# Patient Record
Sex: MALE | Race: WHITE | NOT HISPANIC OR LATINO | Employment: OTHER | URBAN - METROPOLITAN AREA
[De-identification: names, ages, dates, MRNs, and addresses within clinical notes are randomized per-mention and may not be internally consistent; named-entity substitution may affect disease eponyms.]

---

## 2017-01-23 ENCOUNTER — LAB VISIT (OUTPATIENT)
Dept: LAB | Facility: HOSPITAL | Age: 67
End: 2017-01-23
Attending: INTERNAL MEDICINE
Payer: MEDICARE

## 2017-01-23 DIAGNOSIS — R53.83 OTHER MALAISE AND FATIGUE: ICD-10-CM

## 2017-01-23 DIAGNOSIS — G44.009: ICD-10-CM

## 2017-01-23 DIAGNOSIS — E03.8 OTHER SPECIFIED ACQUIRED HYPOTHYROIDISM: ICD-10-CM

## 2017-01-23 DIAGNOSIS — M25.50 PAIN IN JOINT, SITE UNSPECIFIED: Primary | ICD-10-CM

## 2017-01-23 DIAGNOSIS — R53.81 OTHER MALAISE AND FATIGUE: ICD-10-CM

## 2017-01-23 LAB
CCP AB SER IA-ACNC: 1.7 U/ML
COMPLEXED PSA SERPL-MCNC: 0.66 NG/ML
CORTIS SERPL-MCNC: 7 UG/DL
ERYTHROCYTE [SEDIMENTATION RATE] IN BLOOD BY WESTERGREN METHOD: 14 MM/HR
T3FREE SERPL-MCNC: 2.8 PG/ML
T4 FREE SERPL-MCNC: 0.92 NG/DL
THYROGLOB AB SERPL IA-ACNC: 159.4 IU/ML
THYROPEROXIDASE IGG SERPL-ACNC: 15.4 IU/ML
TSH SERPL DL<=0.005 MIU/L-ACNC: 4.32 UIU/ML
URATE SERPL-MCNC: 6.6 MG/DL

## 2017-01-23 PROCEDURE — 82024 ASSAY OF ACTH: CPT

## 2017-01-23 PROCEDURE — 82533 TOTAL CORTISOL: CPT

## 2017-01-23 PROCEDURE — 86200 CCP ANTIBODY: CPT

## 2017-01-23 PROCEDURE — 86225 DNA ANTIBODY NATIVE: CPT

## 2017-01-23 PROCEDURE — 84443 ASSAY THYROID STIM HORMONE: CPT

## 2017-01-23 PROCEDURE — 86235 NUCLEAR ANTIGEN ANTIBODY: CPT | Mod: 59

## 2017-01-23 PROCEDURE — 86038 ANTINUCLEAR ANTIBODIES: CPT

## 2017-01-23 PROCEDURE — 86800 THYROGLOBULIN ANTIBODY: CPT

## 2017-01-23 PROCEDURE — 87798 DETECT AGENT NOS DNA AMP: CPT

## 2017-01-23 PROCEDURE — 84153 ASSAY OF PSA TOTAL: CPT

## 2017-01-23 PROCEDURE — 86617 LYME DISEASE ANTIBODY: CPT | Mod: 91

## 2017-01-23 PROCEDURE — 84481 FREE ASSAY (FT-3): CPT

## 2017-01-23 PROCEDURE — 85651 RBC SED RATE NONAUTOMATED: CPT | Mod: PO

## 2017-01-23 PROCEDURE — 30000890 MAYO MISCELLANEOUS TEST (REFLEX)

## 2017-01-23 PROCEDURE — 84439 ASSAY OF FREE THYROXINE: CPT

## 2017-01-23 PROCEDURE — 86235 NUCLEAR ANTIGEN ANTIBODY: CPT

## 2017-01-23 PROCEDURE — 86039 ANTINUCLEAR ANTIBODIES (ANA): CPT

## 2017-01-23 PROCEDURE — 86376 MICROSOMAL ANTIBODY EACH: CPT

## 2017-01-23 PROCEDURE — 84550 ASSAY OF BLOOD/URIC ACID: CPT

## 2017-01-24 LAB
ACTH PLAS-MCNC: 44 PG/ML
ANA SER QL IF: POSITIVE
ANA TITR SER IF: NORMAL {TITER}
ANTI-SSA ANTIBODY: 0.4 EU
ANTI-SSA INTERPRETATION: NEGATIVE
ANTI-SSB ANTIBODY: 27.38 EU
ANTI-SSB INTERPRETATION: POSITIVE
B BURGDOR AB PATRN SER IB-IMP: NORMAL
B BURGDOR IGG PATRN SER IB-IMP: NORMAL KDA
B BURGDOR IGG SER QL IB: NEGATIVE
B BURGDOR IGM PATRN SER IB-IMP: NORMAL KDA
B BURGDOR IGM SER QL IB: NEGATIVE
DSDNA AB SER-ACNC: NORMAL [IU]/ML

## 2017-01-25 LAB
BARTONELLA DNA BLD QL NAA+PROBE: NEGATIVE
SPECIMEN SOURCE: NORMAL

## 2017-01-26 LAB
ANTI SM ANTIBODY: 0.68 EU
ANTI SM/RNP ANTIBODY: 1.08 EU
ANTI-SM INTERPRETATION: NEGATIVE
ANTI-SM/RNP INTERPRETATION: NEGATIVE
ANTI-SSA ANTIBODY: 0.4 EU
ANTI-SSA INTERPRETATION: NEGATIVE
ANTI-SSB ANTIBODY: 27.38 EU
ANTI-SSB INTERPRETATION: POSITIVE
DSDNA AB SER-ACNC: ABNORMAL [IU]/ML
MAYO MISCELLANEOUS RESULT (REF): NORMAL

## 2017-01-27 LAB — MAYO MISCELLANEOUS RESULT (REF): NORMAL

## 2017-05-08 RX ORDER — SILDENAFIL CITRATE 100 MG/1
TABLET, FILM COATED ORAL
Qty: 8 TABLET | Refills: 11 | Status: SHIPPED | OUTPATIENT
Start: 2017-05-08

## 2017-12-15 DIAGNOSIS — Z11.59 NEED FOR HEPATITIS C SCREENING TEST: ICD-10-CM

## 2017-12-18 ENCOUNTER — PATIENT OUTREACH (OUTPATIENT)
Dept: ADMINISTRATIVE | Facility: HOSPITAL | Age: 67
End: 2017-12-18

## 2017-12-18 NOTE — PROGRESS NOTES
Health Maintenance Due   Topic Date Due    Hepatitis C Screening  1950    TETANUS VACCINE  04/12/1967    Zoster Vaccine  04/12/2009    Pneumococcal (65+) (2 of 2 - PPSV23) 04/25/2017    Influenza Vaccine  08/01/2017     htn report.

## 2017-12-21 ENCOUNTER — LAB VISIT (OUTPATIENT)
Dept: LAB | Facility: HOSPITAL | Age: 67
End: 2017-12-21
Attending: INTERNAL MEDICINE
Payer: MEDICARE

## 2017-12-21 DIAGNOSIS — R14.0 ABDOMINAL DISTENTION: Primary | ICD-10-CM

## 2017-12-21 LAB
GLUCOSE LTT, 15 MIN: 113 MG/DL
GLUCOSE SERPL-MCNC: 90 MG/DL
GLUCOSE, LTT 90 MIN: 65 MG/DL
LACTOSE 1H P 50 G LAC PO SERPL-MCNC: 73 MG/DL
LACTOSE 2H P 50 G LAC PO SERPL-MCNC: 72 MG/DL
LACTOSE 30M P 50 G LAC PO SERPL-MCNC: 80 MG/DL
T4 FREE SERPL-MCNC: 1.03 NG/DL
TSH SERPL DL<=0.005 MIU/L-ACNC: 5.74 UIU/ML

## 2017-12-21 PROCEDURE — 84443 ASSAY THYROID STIM HORMONE: CPT

## 2017-12-21 PROCEDURE — 84439 ASSAY OF FREE THYROXINE: CPT

## 2017-12-21 PROCEDURE — 83516 IMMUNOASSAY NONANTIBODY: CPT | Mod: 59

## 2017-12-21 PROCEDURE — 82951 GLUCOSE TOLERANCE TEST (GTT): CPT

## 2017-12-26 LAB
GLIADIN PEPTIDE IGA SER-ACNC: 8 UNITS
GLIADIN PEPTIDE IGG SER-ACNC: 3 UNITS
IGA SERPL-MCNC: 131 MG/DL
TTG IGA SER IA-ACNC: 5 UNITS
TTG IGG SER IA-ACNC: 5 UNITS

## 2017-12-27 ENCOUNTER — HOSPITAL ENCOUNTER (OUTPATIENT)
Dept: RADIOLOGY | Facility: HOSPITAL | Age: 67
Discharge: HOME | End: 2017-12-27
Attending: INTERNAL MEDICINE
Payer: MEDICARE

## 2017-12-27 DIAGNOSIS — R11.0 NAUSEA: ICD-10-CM

## 2017-12-27 DIAGNOSIS — R10.9 PAIN IN THE ABDOMEN: ICD-10-CM

## 2017-12-27 PROCEDURE — 76700 US EXAM ABDOM COMPLETE: CPT | Mod: 26,,, | Performed by: RADIOLOGY

## 2017-12-27 PROCEDURE — 76700 US EXAM ABDOM COMPLETE: CPT | Mod: TC,PO

## 2018-01-10 ENCOUNTER — INITIAL CONSULT (OUTPATIENT)
Dept: RHEUMATOLOGY | Facility: CLINIC | Age: 68
End: 2018-01-10
Payer: MEDICARE

## 2018-01-10 ENCOUNTER — HOSPITAL ENCOUNTER (OUTPATIENT)
Dept: RADIOLOGY | Facility: HOSPITAL | Age: 68
Discharge: HOME | End: 2018-01-10
Attending: INTERNAL MEDICINE
Payer: MEDICARE

## 2018-01-10 VITALS
BODY MASS INDEX: 38.37 KG/M2 | HEIGHT: 72 IN | SYSTOLIC BLOOD PRESSURE: 133 MMHG | WEIGHT: 283.31 LBS | HEART RATE: 55 BPM | DIASTOLIC BLOOD PRESSURE: 79 MMHG

## 2018-01-10 DIAGNOSIS — M13.0 POLYARTHRITIS: ICD-10-CM

## 2018-01-10 DIAGNOSIS — M10.9 GOUT, UNSPECIFIED CAUSE, UNSPECIFIED CHRONICITY, UNSPECIFIED SITE: ICD-10-CM

## 2018-01-10 DIAGNOSIS — M15.9 OSTEOARTHRITIS OF MULTIPLE JOINTS, UNSPECIFIED OSTEOARTHRITIS TYPE: ICD-10-CM

## 2018-01-10 DIAGNOSIS — M13.0 POLYARTHRITIS: Primary | ICD-10-CM

## 2018-01-10 PROCEDURE — 73610 X-RAY EXAM OF ANKLE: CPT | Mod: 26,50,, | Performed by: RADIOLOGY

## 2018-01-10 PROCEDURE — 99999 PR PBB SHADOW E&M-EST. PATIENT-LVL III: CPT | Mod: PBBFAC,,, | Performed by: INTERNAL MEDICINE

## 2018-01-10 PROCEDURE — 73630 X-RAY EXAM OF FOOT: CPT | Mod: 26,59,RT, | Performed by: RADIOLOGY

## 2018-01-10 PROCEDURE — 99205 OFFICE O/P NEW HI 60 MIN: CPT | Mod: S$PBB,,, | Performed by: INTERNAL MEDICINE

## 2018-01-10 PROCEDURE — 73610 X-RAY EXAM OF ANKLE: CPT | Mod: 50,TC,FY,PO

## 2018-01-10 PROCEDURE — 73630 X-RAY EXAM OF FOOT: CPT | Mod: 50,TC,FY,PO

## 2018-01-10 PROCEDURE — 73130 X-RAY EXAM OF HAND: CPT | Mod: 50,TC,FY,PO

## 2018-01-10 PROCEDURE — 73130 X-RAY EXAM OF HAND: CPT | Mod: 26,50,, | Performed by: RADIOLOGY

## 2018-01-10 PROCEDURE — 99213 OFFICE O/P EST LOW 20 MIN: CPT | Mod: PBBFAC,25,PO | Performed by: INTERNAL MEDICINE

## 2018-01-10 PROCEDURE — 73630 X-RAY EXAM OF FOOT: CPT | Mod: 26,LT,, | Performed by: RADIOLOGY

## 2018-01-10 RX ORDER — CHLORTHALIDONE 25 MG/1
TABLET ORAL
COMMUNITY
Start: 2017-10-13

## 2018-01-10 RX ORDER — NALOXEGOL OXALATE 12.5 MG/1
TABLET, FILM COATED ORAL
COMMUNITY
Start: 2017-11-24

## 2018-01-10 NOTE — LETTER
January 21, 2018      Lilly Jay MD  [address] [address]           [place] [facility] [address] [facility] [address] [place] [address]  Phone: [phone]  Fax: [phone]          Patient: Bradford Yadav Jr.   MR Number: [id number]   YOB: 1950   Date of Visit: 1/10/2018       Dear Dr. Lilly Jay:    Thank you for referring Bradford Yadav to me for evaluation. Attached you will find relevant portions of my assessment and plan of care.    If you have questions, please do not hesitate to call me. I look forward to following Bradford Yadav along with you.    Sincerely,    Maria Esther Moreno, DO    Enclosure  CC:  No Recipients    If you would like to receive this communication electronically, please contact [email or web address] or [phone] to request more information on [employer] access.    For providers and/or their staff who would like to refer a patient to [facility], please contact us through our one-stop-shop provider referral line, [facility] , at [phone].    If you feel you have received this communication in error or would no longer like to receive these types of communications, please e-mail [email or web address]

## 2018-01-10 NOTE — PROGRESS NOTES
Subjective:          Chief Complaint: Bradford Yadav Jr. is a 67 y.o. male who had concerns including possible RA (new patient) and Positive DAVE.    HPI:    Patient is a 67-year-old gentleman is been referred to me for a positive DAVE 1:160 Speckled, in the setting of positive SSB antibody modest titer negative as as a, negative double-stranded DNA, negative SCL M/RNP and a negative Thorne.  He also has a positive TPO antibody and  thyroglobulin antibody consistent with autoimmune thyroid disease.  He has a negative rheumatoid factor and negative CCP antibodies uric acid is within normal limits had extensive  vector panel with Lyme, Bartonella, ehrlichiosis, and babesiosis all negative.and evaluation for RA. Patient states he has gout treated with allopurinol-non-crystal proven no specific joint doesn't describe a classic gouty attack.    Did have have some labs with some +serologies. Complains of knee pain, and ankle pain with hx of injury to the right knee. No swelling in his joints.   Known hx of OA knees seen with pain management.   He notes pain in ankles and knees is worse in AM and improves as the day continues.   He has hx of HCV treated with Dr. Morales in past few years.    Notes metatarsalgia, stiffness in hands and wrists.  Stiffness in the ankles.     Patient denies weight loss, rashes, dry eye, dry mouth, nasal or palatal ulcerations,  lymphadenopathy, Raynaud's, hx of DVT/miscarriages, psoriasis or family hx of psoriasis, rashes, serositis, anemia or other constitutional symptoms.      Component      Latest Ref Rng & Units 12/21/2017 1/23/2017            2:50 PM   Anti Sm Antibody      0.00 - 19.99 EU  0.68   Anti-Sm Interpretation      Negative  Negative   Anti-SSA Antibody      0.00 - 19.99 EU  0.40   Anti-SSA Interpretation      Negative  Negative   Anti-SSB Antibody      0.00 - 19.99 EU  27.38 (H)   Anti-SSB Interpretation      Negative  Positive (A)   ds DNA Ab      Negative 1:10  Negative 1:10    Anti Sm/RNP Antibody      0.00 - 19.99 EU  1.08   Anti-Sm/RNP Interpretation      Negative  Negative   LYME AB IGG BY WB:      Negative Negative    Lyme IgG Bands      kDa p41,    Lyme Ab IgM by WB:      Negative Negative    Lyme IgM Bands      kDa No bands detected    Lyme Disease Ab Interpretation       SEE BELOW    Antigliadin Abs, IgA      <20 UNITS 8    Antigliadin Ab IgG      <20 UNITS 3    TTG IgA      <20 UNITS 5    TTG IgG      <20 UNITS 5    Immunoglobulin A (IgA)      70 - 400 mg/dL 131    Bartonella Specimen Source           Bartonella Result      Not Applicable     Thyroglobulin Ab Screen      0.0 - 3.9 IU/mL     Thyroperoxidase Antibodies      <6.0 IU/mL     Uric Acid      3.4 - 7.0 mg/dL     CCP Antibodies      <5.0 U/mL     [place] Miscellaneous Result        SEE COMMENT   DAVE HEP-2 Titer             Component      Latest Ref Rng & Units 1/23/2017           2:50 PM   Anti Sm Antibody      0.00 - 19.99 EU    Anti-Sm Interpretation      Negative    Anti-SSA Antibody      0.00 - 19.99 EU 0.40   Anti-SSA Interpretation      Negative Negative   Anti-SSB Antibody      0.00 - 19.99 EU 27.38 (H)   Anti-SSB Interpretation      Negative Positive (A)   ds DNA Ab      Negative 1:10 Negative 1:10   Anti Sm/RNP Antibody      0.00 - 19.99 EU    Anti-Sm/RNP Interpretation      Negative    LYME AB IGG BY WB:      Negative Negative   Lyme IgG Bands      kDa p41,   Lyme Ab IgM by WB:      Negative Negative   Lyme IgM Bands      kDa No bands detected   Lyme Disease Ab Interpretation       SEE BELOW   Antigliadin Abs, IgA      <20 UNITS    Antigliadin Ab IgG      <20 UNITS    TTG IgA      <20 UNITS    TTG IgG      <20 UNITS    Immunoglobulin A (IgA)      70 - 400 mg/dL    Bartonella Specimen Source       BLOOD   Bartonella Result      Not Applicable Negative   Thyroglobulin Ab Screen      0.0 - 3.9 IU/mL 159.4 (H)   Thyroperoxidase Antibodies      <6.0 IU/mL 15.4 (H)   Uric Acid      3.4 - 7.0 mg/dL 6.6   CCP Antibodies       <5.0 U/mL 1.7   [place] Miscellaneous Result       SEE COMMENTS   DAVE HEP-2 Titer       Positive 1:160 Speckled       REVIEW OF SYSTEMS:    Review of Systems   Constitutional: Negative for fever, malaise/fatigue and weight loss.   HENT: Negative for sore throat.    Eyes: Negative for double vision, photophobia and redness.   Respiratory: Negative for cough, shortness of breath and wheezing.    Cardiovascular: Negative for chest pain, palpitations and orthopnea.   Gastrointestinal: Negative for abdominal pain, constipation and diarrhea.   Genitourinary: Negative for dysuria, hematuria and urgency.   Musculoskeletal: Positive for joint pain. Negative for back pain and myalgias.   Skin: Negative for rash.   Neurological: Negative for dizziness, tingling, focal weakness and headaches.   Endo/Heme/Allergies: Does not bruise/bleed easily.   Psychiatric/Behavioral: Negative for depression, hallucinations and suicidal ideas.               Objective:            Past Medical History:   Diagnosis Date    DDD (degenerative disc disease), lumbar     Degenerative joint disease (DJD) of hip     DJD (degenerative joint disease) of knee     DJD (degenerative joint disease), ankle and foot     Gastroparesis     full stomach after 8 hours    Gout     Hand arthritis     Hepatitis C, chronic, with coma     treated with Bill with Dr. Morales    HTN (hypertension)     Lumbar spondylosis     Neck pain     Thyroid disease      History reviewed. No pertinent family history.  Social History   Substance Use Topics    Smoking status: Light Tobacco Smoker     Packs/day: 0.00     Types: Cigarettes     Start date: 2/10/1968    Smokeless tobacco: Never Used      Comment: currently smoke 1-2 cigarettes per day    Alcohol use No         Current Outpatient Prescriptions on File Prior to Visit   Medication Sig Dispense Refill    allopurinol (ZYLOPRIM) 100 MG tablet Take 100 mg by mouth once daily.   3    amitriptyline (ELAVIL) 10 MG  tablet Take 10 mg by mouth every evening.   3    gabapentin (NEURONTIN) 400 MG capsule Take 400 mg by mouth. 1 in am and 2 in the afternoon  3    lisinopril (PRINIVIL,ZESTRIL) 20 MG tablet Take 20 mg by mouth once daily.   2    meloxicam (MOBIC) 15 MG tablet Take 15 mg by mouth once daily.   6    SUBOXONE 8-2 mg Film once daily. 2 per day      thyroid, pork, (NP THYROID) 60 mg Tab Take 60 mg by mouth.      vitamin D 1000 units Tab Take 185 mg by mouth once daily.      LINZESS 145 mcg Cap capsule 145 mcg daily as needed.   11    milk thistle 175 mg tablet Take 175 mg by mouth once daily.      oxycodone-acetaminophen (PERCOCET) 5-325 mg per tablet Take 1 tablet by mouth every 4 (four) hours as needed for Pain. 32 tablet 0    spironolactone (ALDACTONE) 25 MG tablet Take 25 mg by mouth once daily.   3    VIAGRA 100 mg tablet TAKE 1 TABLET BY MOUTH AS NEEDED PRIOR TO RELATIONS FOR ERECTILE DISFUNCTION 8 tablet 11    [DISCONTINUED] furosemide (LASIX) 20 MG tablet Take daily as needed for leg swelling 30 tablet 5     No current facility-administered medications on file prior to visit.        Vitals:    01/10/18 0918   BP: 133/79   Pulse: (!) 55       Physical Exam:    Physical Exam   Constitutional: He appears well-developed and well-nourished.   HENT:   Head: Atraumatic.   Nose: No nasal deformity.   Mouth/Throat: No oral lesions. No dental caries.   Eyes: Pupils are equal, round, and reactive to light. Right conjunctiva is not injected. Left conjunctiva is not injected.   Neck: No JVD present.   Cardiovascular: Normal rate and regular rhythm.    Pulmonary/Chest: Effort normal and breath sounds normal.   Abdominal: Soft. Bowel sounds are normal. There is no hepatosplenomegaly.   Musculoskeletal:        Right shoulder: He exhibits normal range of motion, no tenderness, no effusion and no crepitus.        Left shoulder: He exhibits normal range of motion, no tenderness, no effusion and no crepitus.        Right  elbow: He exhibits normal range of motion and no effusion. No tenderness found.        Left elbow: He exhibits normal range of motion and no effusion. No tenderness found.        Right wrist: He exhibits tenderness. He exhibits normal range of motion and no swelling.        Left wrist: He exhibits tenderness. He exhibits normal range of motion and no swelling.        Right hip: He exhibits normal range of motion, no tenderness and no swelling.        Left hip: He exhibits normal range of motion, no tenderness and no swelling.        Right knee: He exhibits normal range of motion and no swelling. No tenderness found.        Left knee: He exhibits normal range of motion and no swelling. No tenderness found.        Right ankle: He exhibits normal range of motion and no swelling. No tenderness.        Left ankle: He exhibits normal range of motion and no swelling. No tenderness.        Right hand: He exhibits tenderness.        Left hand: He exhibits tenderness and deformity (early ulnar drist).        Right foot: There is tenderness.        Left foot: There is tenderness.   Left hand with ulnar drist. No overt synovitis.   + metatarsalgia.      Lymphadenopathy:     He has no cervical adenopathy.   Neurological: He has normal strength.   Skin: Skin is warm, dry and intact.   Psychiatric: He has a normal mood and affect.             Assessment:       Encounter Diagnoses   Name Primary?    Polyarthritis Yes    Gout, unspecified cause, unspecified chronicity, unspecified site     Osteoarthritis of multiple joints, unspecified osteoarthritis type           Plan:        Polyarthritis  -     X-Ray Hand 3 View Bilateral; Future; Expected date: 01/10/2018  -     X-Ray Ankle Complete Bilateral; Future; Expected date: 01/10/2018  -     X-Ray Foot Complete Bilateral; Future; Expected date: 01/10/2018  -     Sedimentation rate, manual; Future; Expected date: 01/10/2018  -     Uric acid; Future; Expected date: 01/10/2018    Gout,  unspecified cause, unspecified chronicity, unspecified site    Osteoarthritis of multiple joints, unspecified osteoarthritis type    Very pleasant 66 y/o gentleman with + DAVE and + thyroid antibodies with negative RF and CCP.  He has hx of extensive OA. Some metatarsalgia and ulnar drift on the left hand.   R/o for inflammatory arthritis. Low likelihood.  Regarding right knee hx of injury not clear but I believe he is having possible injections regarding this.       Return if symptoms worsen or fail to improve.        60min consultation with greater than 50% spent in counseling, chart review and coordination of care. All questions answered.  Thank you for allowing me to participate in the care of this very pleasant patient.

## 2018-01-22 ENCOUNTER — PATIENT OUTREACH (OUTPATIENT)
Dept: ADMINISTRATIVE | Facility: HOSPITAL | Age: 68
End: 2018-01-22

## 2018-01-22 NOTE — LETTER
January 22, 2018    Bradford Yadav Jr.  [address]  [facility] [address]             [facility]  [address]  [facility] [address]  Phone: [phone] Dear Mr. Yadav:    We have tried to reach you by [facility] email unsuccessfully.      [facility] is committed to your overall health.  Our records indicate that you are due for an annual checkup with your primary care provider,  Dr. Colin.  Please call [phone] to schedule a routine physical exam. You may also be due for the following test and/or procedures:     One-time Hepatitis C Screening lab test(a viral condition that can harm the liver)   Tetanus immunization   Shingles immunization   Pneumonia immunization   Influenza vaccine     If you have had any of the above done at another facility, please let us know by calling [phone] so that we can update your record.  We will add these results to your chart if you fax them to the fax number listed below.  If you have any questions, please call [phone].    Sincerely,  Marielos Shetty  Clinical Care Coordinator  [facility] [address] [facility] [address]  [place] [address]  Phone: [phone]   Fax: [phone]

## 2018-01-22 NOTE — PROGRESS NOTES
Health Maintenance Due   Topic Date Due    Hepatitis C Screening  1950    TETANUS VACCINE  04/12/1967    Zoster Vaccine  04/12/2009    Pneumococcal (65+) (2 of 2 - PPSV23) 04/25/2017    Influenza Vaccine  08/01/2017     Portal outreach un-read by patient.  Outreach mailed today

## 2018-04-20 ENCOUNTER — TELEPHONE (OUTPATIENT)
Dept: ORTHOPEDICS | Facility: CLINIC | Age: 68
End: 2018-04-20

## 2018-04-20 NOTE — TELEPHONE ENCOUNTER
----- Message from Devin To sent at 4/19/2018 12:19 PM CDT -----  Contact: Pt's son Bradford  Pt's son is calling in regards to needing proof that the pt is disabled to show to receive food stamps. Pt's son can be reached at [phone] (ylcg)

## 2018-06-13 ENCOUNTER — TELEPHONE (OUTPATIENT)
Dept: FAMILY MEDICINE | Facility: CLINIC | Age: 68
End: 2018-06-13

## 2018-06-13 NOTE — TELEPHONE ENCOUNTER
----- Message from Tere Richards sent at 6/13/2018 12:57 PM CDT -----  Contact: self  Patient would like to re establish care with Dr. Colin. Patient has not been seen by Dr. Colin since 4/25/2016. Please call patient to advise. Thank you

## 2018-06-14 ENCOUNTER — TELEPHONE (OUTPATIENT)
Dept: FAMILY MEDICINE | Facility: CLINIC | Age: 68
End: 2018-06-14

## 2018-06-14 NOTE — TELEPHONE ENCOUNTER
Spoke to Conchis at Dr. Jay' office. She states she will send patient's last office note.     Spoke to pharmacist at [facility]. He states he will fax over current med list with prescribing physician.

## 2018-06-14 NOTE — TELEPHONE ENCOUNTER
Have them send last clinic note from Dr. Jay and also get his pharmacy to send us his current list of meds with the prescribing physician so I can make a more informed decision on this.

## 2018-06-14 NOTE — TELEPHONE ENCOUNTER
Notified patient that he will need to continue to be under care of Dr Jay. Patient verbalized understanding.

## 2018-06-14 NOTE — TELEPHONE ENCOUNTER
----- Message from Evelyn Skaggs sent at 6/14/2018 12:55 PM CDT -----  Type: Needs Medical Advice    Who Called: Dr. Lilly Balderrama   Symptoms (please be specific):  Na  How long has patient had these symptoms:  Na  Pharmacy name and phone #:  [facility]  Best Call Back Number:[phone]  Additional Information: Was advised could not be seen by Dr. Colin due to having another pcp Dr. Lilly Jay but she is an internist, wanted to discuss

## 2018-06-14 NOTE — TELEPHONE ENCOUNTER
Spoke with Conchis at Dr Jay office and she states that Dr Jay only sees patient for the suboxone. Send him where he needs to go to be seen. States that she does not have hospital privileges so she is not his PCP. States that she in an internist and only sees him for the medication. Please advise.

## 2018-06-14 NOTE — TELEPHONE ENCOUNTER
No. As long as he is receiving his suboxone from Dr. Lilly Jay, she will need to function as his primary care physician since she is also a family physician. Medicare will not allow him to be under the care of 2 physicians in the same specialty, and I cannot prescribe suboxone or any pain medications for him.

## 2018-06-15 DIAGNOSIS — M25.551 RIGHT HIP PAIN: Primary | ICD-10-CM

## 2018-06-21 ENCOUNTER — OFFICE VISIT (OUTPATIENT)
Dept: ORTHOPEDICS | Facility: CLINIC | Age: 68
End: 2018-06-21
Payer: MEDICARE

## 2018-06-21 ENCOUNTER — HOSPITAL ENCOUNTER (OUTPATIENT)
Dept: RADIOLOGY | Facility: HOSPITAL | Age: 68
Discharge: HOME | End: 2018-06-21
Attending: ORTHOPAEDIC SURGERY
Payer: MEDICARE

## 2018-06-21 VITALS — HEIGHT: 72 IN | BODY MASS INDEX: 38.33 KG/M2 | WEIGHT: 283 LBS

## 2018-06-21 DIAGNOSIS — M16.11 PRIMARY OSTEOARTHRITIS OF RIGHT HIP: Primary | ICD-10-CM

## 2018-06-21 DIAGNOSIS — M25.551 RIGHT HIP PAIN: ICD-10-CM

## 2018-06-21 PROCEDURE — 73502 X-RAY EXAM HIP UNI 2-3 VIEWS: CPT | Mod: TC,PO,RT

## 2018-06-21 PROCEDURE — 99212 OFFICE O/P EST SF 10 MIN: CPT | Mod: PBBFAC,25,PN | Performed by: ORTHOPAEDIC SURGERY

## 2018-06-21 PROCEDURE — 99999 PR PBB SHADOW E&M-EST. PATIENT-LVL II: CPT | Mod: PBBFAC,,, | Performed by: ORTHOPAEDIC SURGERY

## 2018-06-21 PROCEDURE — 73502 X-RAY EXAM HIP UNI 2-3 VIEWS: CPT | Mod: 26,RT,, | Performed by: RADIOLOGY

## 2018-06-21 PROCEDURE — 99213 OFFICE O/P EST LOW 20 MIN: CPT | Mod: S$PBB,,, | Performed by: ORTHOPAEDIC SURGERY

## 2018-06-21 RX ORDER — ESCITALOPRAM OXALATE 20 MG/1
TABLET ORAL
COMMUNITY
Start: 2018-04-18

## 2018-06-21 RX ORDER — RIBAVIRIN 200 MG/1
TABLET, FILM COATED ORAL
COMMUNITY

## 2018-06-21 RX ORDER — LEVOTHYROXINE SODIUM 25 UG/1
CAPSULE ORAL
COMMUNITY

## 2018-06-21 NOTE — PROGRESS NOTES
HISTORY OF PRESENT ILLNESS:  Leif 68 years old, bilateral hip pain limited   motion, has had this now for years, presents in a wheelchair, limited and   painful motion of the hips.    X-rays show severe arthritic changes.    ASSESSMENT:  Bilateral hip arthrosis.    PLAN:  I am not sure at this point he is an excellent candidate for surgery.  We   discussed with him his increased risk factors and his increased BMI, smoking,   his hygiene, and probable peripheral vascular problems as well.  At this point,   we are going to continue conservative care.  If we would proceed with hip   replacement, we would like to have him tuned up a little bit better than this   before taking on that big risk.      ALEXANDRA/TOBI  dd: 06/21/2018 16:04:50 (CDT)  td: 06/22/2018 02:07:47 (CDT)  Doc ID   #[id number]  Job ID #[id number]    CC:

## 2018-06-22 ENCOUNTER — TELEPHONE (OUTPATIENT)
Dept: ORTHOPEDICS | Facility: CLINIC | Age: 68
End: 2018-06-22

## 2019-07-19 ENCOUNTER — HOSPITAL ENCOUNTER (OUTPATIENT)
Dept: RADIOLOGY | Facility: HOSPITAL | Age: 69
Discharge: HOME | End: 2019-07-19
Attending: INTERNAL MEDICINE
Payer: MEDICARE

## 2019-07-19 DIAGNOSIS — R10.9 PAIN IN THE ABDOMEN: ICD-10-CM

## 2019-07-19 DIAGNOSIS — R63.4 LOSS OF WEIGHT: ICD-10-CM

## 2019-07-19 PROCEDURE — 76700 US EXAM ABDOM COMPLETE: CPT | Mod: TC,PO

## 2019-07-19 PROCEDURE — 76857 US PELIVS LIMITED NON OB: ICD-10-PCS | Mod: 26,,, | Performed by: RADIOLOGY

## 2019-07-19 PROCEDURE — 76857 US EXAM PELVIC LIMITED: CPT | Mod: 26,,, | Performed by: RADIOLOGY

## 2019-07-19 PROCEDURE — 76700 US EXAM ABDOM COMPLETE: CPT | Mod: 26,,, | Performed by: RADIOLOGY

## 2019-07-19 PROCEDURE — 76700 US ABDOMEN COMPLETE: ICD-10-PCS | Mod: 26,,, | Performed by: RADIOLOGY

## 2019-07-19 PROCEDURE — 76857 US EXAM PELVIC LIMITED: CPT | Mod: TC,PO

## 2021-07-01 ENCOUNTER — PATIENT MESSAGE (OUTPATIENT)
Dept: ADMINISTRATIVE | Facility: OTHER | Age: 71
End: 2021-07-01